# Patient Record
Sex: MALE | NOT HISPANIC OR LATINO | URBAN - NONMETROPOLITAN AREA
[De-identification: names, ages, dates, MRNs, and addresses within clinical notes are randomized per-mention and may not be internally consistent; named-entity substitution may affect disease eponyms.]

---

## 2019-10-03 ENCOUNTER — OFFICE VISIT CONVERTED (OUTPATIENT)
Dept: FAMILY MEDICINE CLINIC | Age: 24
End: 2019-10-03
Attending: FAMILY MEDICINE

## 2021-05-18 NOTE — PROGRESS NOTES
Luis Gardner 1995     Office/Outpatient Visit    Visit Date: Thu, Oct 3, 2019 03:48 pm    Provider: García Davis MD (Assistant: Sarah Spurling, MA)    Location: [facility]        Electronically signed by García Davis MD on  10/03/2019 08:52:19 PM                             SUBJECTIVE:        CC:     Mr. Gardner is a 24 year old male.  This is his first visit to the clinic.  Says it has been a while since  he has been to the doctor. He has been dealing with anxiety and it is getting worse.  He said he will get his flu vaccine today.          HPI:         PHQ-9 Depression Screening: Completed form scanned and in chart; Total Score 13     Patient requests influenza vaccination today.     Patient reports that ever since graduating from college several months ago he feels as though he does not know what to do with himself.  He says he does not know where to fit in.  He is having issues with falling asleep at night.  He often feels bad about himself as if he is a failure.  He endorses anhedonia.  He denies suicidal or homicidal ideations. He is anxious about being involved in social situations and would rather just stay home. Is not interested in starting any medications.  He would be interested in establishing with a therapist.  He says that, overall, he knows he just needs to find something to immerse himself in to make himself feel connected again.     ROS:     CONSTITUTIONAL:  Negative for chills, fatigue, fever, and weight change.      EYES:  Negative for blurred vision.      E/N/T:  Negative for ear pain and tinnitus.      CARDIOVASCULAR:  Negative for chest pain, dizziness, palpitations and edema.      RESPIRATORY:  Negative for dyspnea and cough.      GASTROINTESTINAL:  Negative for abdominal pain, constipation, diarrhea, heartburn, nausea and vomiting.      GENITOURINARY:  Negative for dysuria, hematuria and polyuria.      MUSCULOSKELETAL:  Negative for arthralgias and myalgias.       INTEGUMENTARY:  Negative for rash.      NEUROLOGICAL:  Negative for headaches, paresthesias and weakness.      HEMATOLOGIC/LYMPHATIC:  Negative for easy bruising and excessive bleeding.      ENDOCRINE:  Negative for hair loss, heat/cold intolerance, polydipsia, and polyphagia.      PSYCHIATRIC:  Positive for anxiety, depression, anhedonia and sleep disturbance.   Negative for suicidal thoughts.          PMH/FMH/SH:     Last Reviewed on 10/03/2019 08:51 PM by García Davis    Past Medical History:             PAST MEDICAL HISTORY     UNREMARKABLE         Surgical History:     NONE         Family History:     Father: Healthy     Mother: Medical history unknown; Healthy         Tobacco/Alcohol/Supplements:     Last Reviewed on 10/03/2019 08:51 PM by García Davis    Tobacco: He has never smoked.  Non-drinker         Substance Abuse History:     Last Reviewed on 10/03/2019 08:51 PM by García Davis    None         Mental Health History:     Last Reviewed on 10/03/2019 08:51 PM by García Davis    NEGATIVE         Communicable Diseases (eg STDs):     Last Reviewed on 10/03/2019 08:51 PM by García Davis    Reportable health conditions; NEGATIVE             Current Problems:     Last Reviewed on 10/03/2019 08:51 PM by García Davis    Depression with anxiety     Screening for depression         Immunizations:     Fluzone Quadrivalent (3+ years) 10/3/2019         Allergies:     Last Reviewed on 10/03/2019 08:51 PM by García Davis      No Known Drug Allergies.         Current Medications:     Last Reviewed on 10/03/2019 08:51 PM by García Davis      No Known Medications.         OBJECTIVE:        Vitals:         Current: 10/3/2019 3:53:15 PM    Ht:  5 ft, 9 in;  Wt: 132.6 lbs;  BMI: 19.6    T: 98 F (oral);  BP: 125/57 mm Hg (right arm, sitting);  P: 68 bpm (right arm (BP Cuff), sitting)        Exams:     PHYSICAL EXAM:     GENERAL: Vitals recorded well developed, well nourished;  no apparent distress;     EYES:  "conjunctiva and cornea are normal;     E/N/T:  normal EACs, TMs, nasal/oral mucosa, teeth, gingiva, and oropharynx;     NECK:  supple, full ROM; no thyromegaly; no carotid bruits;     RESPIRATORY: Clear to auscultation bilateally; no rales (\"crackles\") present; no rhonchi; no wheezes;     CARDIOVASCULAR: normal rate; rhythm is regular;  No murmurs, clicks, gallops or rubs appreciated; no edema;     GASTROINTESTINAL: nontender; Soft and nondistended; normal bowel sounds; no organomegaly; no masses;     LYMPHATIC: no enlargement of cervical or facial nodes; no supraclavicular nodes;     SKIN:  No significant rashes, lesions or suspicious moles within limits of examination;     NEUROLOGIC: mental status: alert and oriented x 3; Grossly intact;     PSYCHIATRIC: appropriate affect and demeanor; normal speech pattern; Normal behavior;         Procedures:     Vaccination against other viral diseases, Influenza     1. Influenza, seasonal PF (children 3 years to adult): 0.5 ml unit dose given IM in the right upper arm; administered by SCS;  lot number [id number]; expires 6/30/20             ASSESSMENT           300.4   F43.23  Depression with anxiety              DDx:     V79.0   Z13.31  Screening for depression              DDx:     V04.81   Z23  Vaccination against other viral diseases, Influenza              DDx:         ORDERS:         Procedures Ordered:       30740  Immunization administration; one vaccine  (In-House)           Other Orders:       57080  Influenza virus vaccine, quadrivalent, split virus, preservative free 3 years of age & older  (In-House)           Depression screen positive and follow up plan documented  (In-House)                   PLAN:          Depression with anxiety - PHQ 9 score of 13 consistent with moderate depression.  Patient's description of worry and fear in social situations are consistent with anxiety. He declines initiation of an antidepressant today.  He was provided with a list " of local therapist and says he is interested in establishing with 1 of them.  If he indeed does establish with one, he has been asked to let us know. He also indicates that he plans to find something to do socially or as a hobby to make himself feel connected as he has not had that since graduating college. He will return to clinic in 2 months for reevaluation.     [facility] Vaccines Flu and Pneumonia updated in Shot record          Screening for depression - See depression plan above     MIPS PHQ-9 Depression Screening: Completed form scanned and in chart; Total Score 13 Positive Depression Screen: Establish with therapist; RTC in 2 months           Orders:         Depression screen positive and follow up plan documented  (In-House)            Vaccination against other viral diseases, Influenza - Influenza vaccination given in office today.  Patient tolerated well without complications.           Orders:       89898  Influenza virus vaccine, quadrivalent, split virus, preservative free 3 years of age & older  (In-House)         05244  Immunization administration; one vaccine  (In-House)               CHARGE CAPTURE           **Please note: ICD descriptions below are intended for billing purposes only and may not represent clinical diagnoses**        Primary Diagnosis:         300.4 Depression with anxiety            F43.23    Adjustment disorder with mixed anxiety and depressed mood              Orders:          98484   Office visit - new pt, level 3  (In-House)           V79.0 Screening for depression            Z13.31    Encounter for screening for depression              Orders:             Depression screen positive and follow up plan documented  (In-House)           V04.81 Vaccination against other viral diseases, Influenza            Z23    Encounter for immunization              Orders:          76107   Influenza virus vaccine, quadrivalent, split virus, preservative free 3 years of age & older   (In-House)             48846   Immunization administration; one vaccine  (In-House)

## 2021-07-01 VITALS
TEMPERATURE: 98 F | BODY MASS INDEX: 19.64 KG/M2 | HEART RATE: 68 BPM | HEIGHT: 69 IN | WEIGHT: 132.6 LBS | DIASTOLIC BLOOD PRESSURE: 57 MMHG | SYSTOLIC BLOOD PRESSURE: 125 MMHG